# Patient Record
Sex: MALE | Race: WHITE
[De-identification: names, ages, dates, MRNs, and addresses within clinical notes are randomized per-mention and may not be internally consistent; named-entity substitution may affect disease eponyms.]

---

## 2017-09-15 ENCOUNTER — HOSPITAL ENCOUNTER (EMERGENCY)
Dept: HOSPITAL 74 - JERFT | Age: 22
Discharge: HOME | End: 2017-09-15
Payer: COMMERCIAL

## 2017-09-15 VITALS — DIASTOLIC BLOOD PRESSURE: 80 MMHG | SYSTOLIC BLOOD PRESSURE: 138 MMHG | TEMPERATURE: 99.7 F | HEART RATE: 90 BPM

## 2017-09-15 VITALS — BODY MASS INDEX: 26.2 KG/M2

## 2017-09-15 DIAGNOSIS — R50.9: Primary | ICD-10-CM

## 2017-09-15 DIAGNOSIS — G44.89: ICD-10-CM

## 2017-09-15 NOTE — PDOC
History of Present Illness





- General


Chief Complaint: Headache


Stated Complaint: HEADACHE


Time Seen by Provider: 09/15/17 15:04


History Source: Patient


Exam Limitations: No Limitations





- History of Present Illness


Initial Comments: 


09/15/17 15:58





CHIEF COMPLAINT: Headache





HISTORY OF PRESENT ILLNESS: This is an otherwise healthy 22 year old  who 

presents for evaluation of gradual onset, generalized headache, subjective fever

/chills, and one episode of vomiting since last night. He denies neck pain/

stiffness, weakness, visual changes, ataxia, or any other symptoms. He has not 

had any recent travel or sick contacts. He took ibuprofen at 6am with some 

relief of symptoms. He was able to eat normally today.





V/s on arrival are notable for oral temp of 99.7.





REVIEW OF SYSTEMS:


GENERAL/CONSTITUTIONAL: Subjective fevers/cjills. No weakness. No weight change.


HEAD, EYES, EARS, NOSE AND THROAT: No change in vision. Right ear pain, throat 

pain. 


CARDIOVASCULAR: No chest pain or palpitations.


RESPIRATORY: No cough, wheezing, or shortness of breath.


GASTROINTESTINAL: Vomiting x 1. No diarrhea or constipation. 


GENITOURINARY: No dysuria, frequency, or change in urination.


MUSCULOSKELETAL: No joint or muscle swelling or pain. No neck or back pain.


SKIN: No rash or easy bruising.


NEUROLOGIC: Gradual onset, generalized headache. o vertigo, loss of 

consciousness, or loss of sensation.


PSYCHIATRIC: No depression or anxiety.


ENDOCRINE: No increased thirst. No abnormal weight change.


HEMATOLOGIC/LYMPHATIC: No anemia, easy bleeding, or history of blood clots.


ALLERGIC/IMMUNOLOGIC: No hives or skin allergy. No latex allergy.





PHYSICAL EXAM:


GENERAL: The patient is awake, alert, and fully oriented, in no acute distress.


HEAD: Normal with no signs of trauma.


ENT: Pupils equal, round and reactive to light, extraocular movements intact, 

sclera anicteric, conjunctiva clear. Neck supple. No pharyngeal erythema or 

tonsillar swelling. Auditory canals and TMs normal.


LUNGS: Clear to auscultation bilaterally. Normal excursion. No respiratory 

distress or use of accessory muscles.


CV: RRR, S1/S2, no MRG. Cap refill < 2 sec.


ABDOMEN: Soft, non-distended, non-tender.


EXTREMITIES: Normal range of motion, no edema.


NEUROLOGICAL: Normal speech, normal gait. CN II-XII grossly intact.


PSYCH: Normal mood, normal affect.


SKIN: Warm, dry, normal turgor, no rashes or lesions noted.





Past History





- Past Medical History


Allergies/Adverse Reactions: 


 Allergies











Allergy/AdvReac Type Severity Reaction Status Date / Time


 


No Known Allergies Allergy   Verified 09/15/17 14:53











Home Medications: 


Ambulatory Orders





Ibuprofen [Motrin -] 600 mg PO QID PRN #28 tablet 09/15/17 








Other medical history: denies.





- Psycho/Social/Smoking Cessation Hx


Suicidal Ideation: No


Smoking History: Never smoked


Hx Alcohol Use: No


Drug/Substance Use Hx: No


Substance Use Type: None





*Physical Exam





- Vital Signs


 Last Vital Signs











Temp Pulse Resp BP Pulse Ox


 


 99.7 F H  90   18   138/80   97 


 


 09/15/17 14:53  09/15/17 14:53  09/15/17 14:53  09/15/17 14:53  09/15/17 14:53














Medical Decision Making





- Medical Decision Making





09/15/17 17:03


A/P: 22 year old male with subjective fever/chills, headache, and one episode 

of vomiting. No neurologic deficits or meningismus. Tolerating PO. Suspect 

viral syndrome.





-Ibuprofen for symptomatic relief


-Flu negative


-Followup instructions and return precautions reviewed





*DC/Admit/Observation/Transfer


Diagnosis at time of Disposition: 


Fever


Qualifiers:


 Fever type: unspecified Qualified Code(s): R50.9 - Fever, unspecified





Headache


Qualifiers:


 Headache type: other headache syndrome Qualified Code(s): G44.89 - Other 

headache syndrome





- Discharge Dispostion


Disposition: HOME


Condition at time of disposition: Improved


Admit: No





- Prescriptions


Prescriptions: 


Ibuprofen [Motrin -] 600 mg PO QID PRN #28 tablet


 PRN Reason: Pain Or Fever





- Referrals


Referrals: 


Zaida Rodgers MD [Staff Physician] - Call tomorrow (Primary care)





- Patient Instructions


Printed Discharge Instructions:  DI for Headache


Additional Instructions: 


Your flu test is negative.


Rest and stay well-hydrated.


Take ibuprofen as prescribed for pain and fever.


Follow up with primary care (referral enclosed).


Return for sudden/worsening headache or any other concerning symptoms.





- Post Discharge Activity


Work/School Note:  Back to Work

## 2018-06-06 ENCOUNTER — HOSPITAL ENCOUNTER (EMERGENCY)
Dept: HOSPITAL 74 - JERFT | Age: 23
Discharge: HOME | End: 2018-06-06
Payer: SELF-PAY

## 2018-06-06 VITALS — SYSTOLIC BLOOD PRESSURE: 125 MMHG | TEMPERATURE: 98.3 F | DIASTOLIC BLOOD PRESSURE: 77 MMHG

## 2018-06-06 VITALS — BODY MASS INDEX: 27.4 KG/M2
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
## 2018-06-06 DIAGNOSIS — S61.012A: Primary | ICD-10-CM
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
## 2018-06-06 PROCEDURE — 0HQGXZZ REPAIR LEFT HAND SKIN, EXTERNAL APPROACH: ICD-10-PCS

## 2018-06-06 NOTE — PDOC
Rapid Medical Evaluation


Time Seen by Provider: 06/06/18 17:00


Medical Evaluation: 


 Allergies











Allergy/AdvReac Type Severity Reaction Status Date / Time


 


No Known Allergies Allergy   Verified 09/15/17 14:53








I have performed a brief in-person evaluation of this patient. 


The patient presents with a chief complaint of:  laceration to left thumb on 

glass doorknob that broke


Pertinent physical exam findings:  laceration to tip of left thumb


I have ordered the following:  Tetanus


The patient will proceed to the ED for further evaluation.

















**Discharge Disposition





- Diagnosis


Finger laceration


Qualifiers:


 Encounter type: initial encounter Finger: thumb Damage to nail status: 

unspecified Foreign body presence: without foreign body Laterality: left 

Qualified Code(s): S61.012A - Laceration without foreign body of left thumb 

without damage to nail, initial encounter








- Referrals





- Patient Instructions





- Post Discharge Activity

## 2018-06-06 NOTE — PDOC
History of Present Illness





- General


Chief Complaint: Injury


Stated Complaint: LACERATION


Time Seen by Provider: 06/06/18 17:00


History Source: Patient


Exam Limitations: No Limitations





- History of Present Illness


Initial Comments: 





06/06/18 18:02


Patient is a 22-year-old male with no past medical history who presents 

emergency department today with a laceration to his left distal finger. Patient 

states he was turning a glass doorknob when it broke cutting his finger. He 

does not remit the date of his last tetanus shot. States he is able to fully 

move his thumb. Denies fevers, chills, weakness, numbness and tingling to the 

extremity.





Past History





- Travel


Traveled outside of the country in the last 30 days: No


Close contact w/someone who was outside of country & ill: No





- Past History


Allergies/Adverse Reactions: 


Allergies





No Known Allergies Allergy (Verified 06/06/18 17:01)


 








Home Medications: 


Ambulatory Orders





NK [No Known Home Medication]  06/06/18 








Tetanus Status: Unknown





- Social History


Smoking Status: Never smoked





**Review of Systems





- Review of Systems


Able to Perform ROS?: Yes


Comments:: 





06/06/18 17:20


CONSTITUTIONAL: 


Absent: fever, chills, diaphoresis, generalized weakness, malaise, loss of 

appetite


MUSCULOSKELETAL: 


Absent: myalgia, arthralgia, joint swelling


SKIN: 


Laceration to L thumb Absent: rash, itching, pallor


NEUROLOGIC: 


Absent: headache, focal weakness or paresthesias, dizziness, unsteady gait, 

seizure, mental status changes, bladder or bowel incontinence


PSYCHIATRIC: 


Absent: anxiety, depression, suicidal or homicidal ideation, hallucinations.


Is the patient limited English proficient: No





*Physical Exam





- Vital Signs


 Last Vital Signs











Temp Pulse Resp BP Pulse Ox


 


 98.3 F         125/77   100 


 


 06/06/18 17:01        06/06/18 17:01  06/06/18 17:01














- Physical Exam


Comments: 





06/06/18 17:21


GENERAL: [The patient is awake, alert, and fully oriented, in no acute distress.

]


HEAD: [Normal with no signs of trauma.]


EYES: [Pupils equal, round and reactive to light, extraocular movements intact, 

sclera anicteric, conjunctiva clear.]


EXTREMITIES: [Normal range of motion, able to fully flex and extend L thumb, no 

edema.]


NEUROLOGICAL: [Normal speech, normal gait.]


PSYCH: [Normal mood, normal affect.]


SKIN: [2.5cm flap laceration to the distal L thumb. Warm, Dry, normal turgor, 

no rashes or lesions noted.]








Procedures





- Laceration/Wound Repair


  ** Left Distal 1st digit


Wound Length: to 2.5 cm


Wound's Depth, Shape: superficial, flap


Irrigated w/ Saline: Yes


Betadine Prep: Yes


Anesthesia: 1% Lidocaine


Amount of Anesthetic (ccs): 2 (Digital block)


Wound Repaired With: Sutures


Suture Size/Type: 5:0


Number of Sutures: 6 (simple interrupted)


Layer Closure: No


Sterile Dressing Applied: Yes





Medical Decision Making





- Medical Decision Making





06/06/18 18:33


Patient is a 22-year-old male with no past medical history who presents 

emergency department today with a laceration to his left distal thumb. Wound 

repaired with sutures at this time. See procedure note. Patient has right-hand 

dominant. Return precautions given. Instructed patient to return in 7-10 days 

to have his sutures removed. Patient says all discharge instructions and all 

questions were answered.





*DC/Admit/Observation/Transfer


Diagnosis at time of Disposition: 


Finger laceration


Qualifiers:


 Encounter type: initial encounter Finger: thumb Damage to nail status: 

unspecified Foreign body presence: without foreign body Laterality: left 

Qualified Code(s): S61.012A - Laceration without foreign body of left thumb 

without damage to nail, initial encounter








- Discharge Dispostion


Disposition: HOME


Condition at time of disposition: Good


Decision to Admit order: No





- Referrals


Referrals: 


aNgi Francisco MD [Staff Physician] - 





- Patient Instructions


Printed Discharge Instructions:  DI for Laceration Repair


Additional Instructions: 


You had your cut fixed today with stitches.


Please return in 7-10 days to have your stitches removed.


Your tetanus shot was updated today.


Avoid soaking the finger. Keep it dry when showering. Wear a glove when 

showering or washing dishes.


Please keep the area clean and pat dry. 


You may use bacitracin once a day.


You may take Tylenol or Motrin as needed for pain. Follow the 's 

instructions





Return to the emergency department sooner if you have area of redness around 

the site, purulent drainage, fevers, or have any changes in your symptoms.





- Post Discharge Activity


Forms/Work/School Notes:  Back to Work

## 2018-06-15 ENCOUNTER — HOSPITAL ENCOUNTER (EMERGENCY)
Dept: HOSPITAL 74 - JERFT | Age: 23
Discharge: HOME | End: 2018-06-15
Payer: SELF-PAY

## 2018-06-15 VITALS — HEART RATE: 80 BPM | TEMPERATURE: 98.2 F | SYSTOLIC BLOOD PRESSURE: 137 MMHG | DIASTOLIC BLOOD PRESSURE: 60 MMHG

## 2018-06-15 VITALS — BODY MASS INDEX: 26.6 KG/M2

## 2018-06-15 DIAGNOSIS — Z48.02: Primary | ICD-10-CM

## 2018-06-15 NOTE — PDOC
Suture Removal/Wound Check HPI





- History of Present Illness


Chief Complaint: Suture/Staple Removal(Here)


Stated Complaint: SUTURE REMOVAL


Time Seen by Provider: 06/15/18 16:42


History Source: Yes: Patient


Exam Limitations: Yes: No Limitations


Treated at: [facility]


Date of Last ED visit: 06/06/18





- Previous ED Treatment


Type of procedure performed on last visit: Yes: Laceration Repair


Tetanus Immunization: Yes: Up to Date





Past History





- Past Medical History


Allergies/Adverse Reactions: 


 Allergies











Allergy/AdvReac Type Severity Reaction Status Date / Time


 


No Known Allergies Allergy   Verified 06/06/18 17:01











Home Medications: 


Ambulatory Orders





NK [No Known Home Medication]  06/06/18 








COPD: No





- Suicide/Smoking/Psychosocial Hx


Smoking History: Never smoked


Have you smoked in the past 12 months: No


Hx Alcohol Use: No


Drug/Substance Use Hx: No


Substance Use Type: None





Suture Removal/Wound Check PE





- Physical Exam


Laceration/Wound Check Symptoms: reports: None


Current Severity Level: None


Maximum Severity Level: None


Pain Localization: None


Location of Laceration/Wound: left: Finger (thumb)


Pain Radiation: None





Medical Decision Making





- Medical Decision Making





06/15/18 16:50


A/P:





Wound well approximated. No discharge or drainage. 6 sutures removed without 

incident.





*DC/Admit/Observation/Transfer


Diagnosis at time of Disposition: 


 Visit for suture removal








- Discharge Dispostion


Disposition: HOME


Condition at time of disposition: Stable


Decision to Admit order: No





- Referrals





- Patient Instructions


Printed Discharge Instructions:  DI for Suture Removal


Additional Instructions: 


Return to ER for any concerns.





- Post Discharge Activity

## 2022-11-29 ENCOUNTER — HOSPITAL ENCOUNTER (EMERGENCY)
Dept: HOSPITAL 74 - JER | Age: 27
Discharge: HOME | End: 2022-11-29
Payer: COMMERCIAL

## 2022-11-29 VITALS
SYSTOLIC BLOOD PRESSURE: 113 MMHG | TEMPERATURE: 97.8 F | RESPIRATION RATE: 18 BRPM | DIASTOLIC BLOOD PRESSURE: 69 MMHG | HEART RATE: 70 BPM

## 2022-11-29 VITALS — BODY MASS INDEX: 26.6 KG/M2

## 2022-11-29 DIAGNOSIS — R07.89: Primary | ICD-10-CM

## 2022-11-29 LAB
ALBUMIN SERPL-MCNC: 4 G/DL (ref 3.4–5)
ALP SERPL-CCNC: 103 U/L (ref 45–117)
ALT SERPL-CCNC: 106 U/L (ref 13–61)
ANION GAP SERPL CALC-SCNC: 7 MMOL/L (ref 8–16)
AST SERPL-CCNC: 40 U/L (ref 15–37)
BASOPHILS # BLD: 0.7 % (ref 0–2)
BILIRUB SERPL-MCNC: 0.4 MG/DL (ref 0.2–1)
BUN SERPL-MCNC: 11.8 MG/DL (ref 7–18)
CALCIUM SERPL-MCNC: 8.8 MG/DL (ref 8.5–10.1)
CHLORIDE SERPL-SCNC: 104 MMOL/L (ref 98–107)
CO2 SERPL-SCNC: 28 MMOL/L (ref 21–32)
CREAT SERPL-MCNC: 1 MG/DL (ref 0.55–1.3)
DEPRECATED RDW RBC AUTO: 13 % (ref 11.9–15.9)
EOSINOPHIL # BLD: 2.8 % (ref 0–4.5)
GLUCOSE SERPL-MCNC: 93 MG/DL (ref 74–106)
HCT VFR BLD CALC: 46.3 % (ref 35.4–49)
HGB BLD-MCNC: 15.7 GM/DL (ref 11.7–16.9)
LYMPHOCYTES # BLD: 34.9 % (ref 8–40)
MAGNESIUM SERPL-MCNC: 2.2 MG/DL (ref 1.8–2.4)
MCH RBC QN AUTO: 31 PG (ref 25.7–33.7)
MCHC RBC AUTO-ENTMCNC: 33.8 G/DL (ref 32–35.9)
MCV RBC: 91.7 FL (ref 80–96)
MONOCYTES # BLD AUTO: 9.7 % (ref 3.8–10.2)
NEUTROPHILS # BLD: 51.9 % (ref 42.8–82.8)
PLATELET # BLD AUTO: 297 10^3/UL (ref 134–434)
PMV BLD: 7.9 FL (ref 7.5–11.1)
PROT SERPL-MCNC: 7.5 G/DL (ref 6.4–8.2)
RBC # BLD AUTO: 5.05 M/MM3 (ref 4–5.6)
SODIUM SERPL-SCNC: 139 MMOL/L (ref 136–145)
WBC # BLD AUTO: 7.8 K/MM3 (ref 4–10)

## 2022-11-29 PROCEDURE — 3E023GC INTRODUCTION OF OTHER THERAPEUTIC SUBSTANCE INTO MUSCLE, PERCUTANEOUS APPROACH: ICD-10-PCS

## 2023-04-25 ENCOUNTER — HOSPITAL ENCOUNTER (EMERGENCY)
Dept: HOSPITAL 74 - JERFT | Age: 28
Discharge: HOME | End: 2023-04-25
Payer: COMMERCIAL

## 2023-04-25 VITALS — BODY MASS INDEX: 26.6 KG/M2

## 2023-04-25 VITALS
HEART RATE: 77 BPM | TEMPERATURE: 98.2 F | DIASTOLIC BLOOD PRESSURE: 82 MMHG | RESPIRATION RATE: 18 BRPM | SYSTOLIC BLOOD PRESSURE: 118 MMHG

## 2023-04-25 DIAGNOSIS — S91.331A: Primary | ICD-10-CM
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
## 2023-05-06 ENCOUNTER — HOSPITAL ENCOUNTER (EMERGENCY)
Dept: HOSPITAL 74 - JERFT | Age: 28
Discharge: HOME | End: 2023-05-06
Payer: COMMERCIAL

## 2023-05-06 VITALS
HEART RATE: 73 BPM | DIASTOLIC BLOOD PRESSURE: 75 MMHG | SYSTOLIC BLOOD PRESSURE: 119 MMHG | RESPIRATION RATE: 19 BRPM | TEMPERATURE: 97.8 F

## 2023-05-06 VITALS — BODY MASS INDEX: 27.1 KG/M2

## 2023-05-06 DIAGNOSIS — H02.844: Primary | ICD-10-CM

## 2023-09-01 ENCOUNTER — OFFICE (OUTPATIENT)
Dept: URBAN - METROPOLITAN AREA CLINIC 121 | Facility: CLINIC | Age: 28
Setting detail: OPHTHALMOLOGY
End: 2023-09-01
Payer: MEDICAID

## 2023-09-01 DIAGNOSIS — H47.393: ICD-10-CM

## 2023-09-01 PROCEDURE — 92004 COMPRE OPH EXAM NEW PT 1/>: CPT | Performed by: OPHTHALMOLOGY

## 2023-09-01 PROCEDURE — 92250 FUNDUS PHOTOGRAPHY W/I&R: CPT | Performed by: OPHTHALMOLOGY

## 2023-09-01 ASSESSMENT — PACHYMETRY
OD_CT_UM: 508
OS_CT_UM: 513
OS_CT_CORRECTION: 2
OD_CT_CORRECTION: 3

## 2023-09-01 ASSESSMENT — TONOMETRY
OD_IOP_MMHG: 16
OS_IOP_MMHG: 16

## 2023-09-01 ASSESSMENT — KERATOMETRY
OS_K1POWER_DIOPTERS: 41.25
OS_AXISANGLE_DEGREES: 087
OD_K1POWER_DIOPTERS: 41.00
OD_K2POWER_DIOPTERS: 42.50
OD_AXISANGLE_DEGREES: 090
OS_K2POWER_DIOPTERS: 42.00

## 2023-09-01 ASSESSMENT — AXIALLENGTH_DERIVED
OS_AL: 24.6154
OD_AL: 24.5655

## 2023-09-01 ASSESSMENT — SPHEQUIV_DERIVED
OS_SPHEQUIV: -0.75
OD_SPHEQUIV: -0.75

## 2023-09-01 ASSESSMENT — VISUAL ACUITY
OD_BCVA: 20/25
OS_BCVA: 20/25

## 2023-09-01 ASSESSMENT — REFRACTION_AUTOREFRACTION
OD_SPHERE: -1.00
OD_CYLINDER: +0.50
OS_AXIS: 074
OS_CYLINDER: +0.50
OD_AXIS: 082
OS_SPHERE: -1.00

## 2023-09-01 ASSESSMENT — CONFRONTATIONAL VISUAL FIELD TEST (CVF)
OD_FINDINGS: FULL
OS_FINDINGS: FULL

## 2025-02-18 ENCOUNTER — HOSPITAL ENCOUNTER (EMERGENCY)
Dept: HOSPITAL 74 - JER | Age: 30
Discharge: HOME | End: 2025-02-18
Payer: COMMERCIAL

## 2025-02-18 VITALS — BODY MASS INDEX: 26.6 KG/M2

## 2025-02-18 VITALS
TEMPERATURE: 98.4 F | SYSTOLIC BLOOD PRESSURE: 116 MMHG | DIASTOLIC BLOOD PRESSURE: 84 MMHG | RESPIRATION RATE: 18 BRPM | HEART RATE: 76 BPM

## 2025-02-18 DIAGNOSIS — R11.0: ICD-10-CM

## 2025-02-18 DIAGNOSIS — K21.9: Primary | ICD-10-CM

## 2025-02-18 DIAGNOSIS — R10.10: ICD-10-CM

## 2025-02-18 DIAGNOSIS — Z20.822: ICD-10-CM

## 2025-02-18 LAB
ALBUMIN SERPL-MCNC: 3.9 G/DL (ref 3.4–5)
ALP SERPL-CCNC: 91 U/L (ref 45–117)
ALT SERPL-CCNC: 37 U/L (ref 13–61)
ANION GAP SERPL CALC-SCNC: 3 MMOL/L (ref 4–13)
AST SERPL-CCNC: 21 U/L (ref 15–37)
BASOPHILS # BLD: 0.6 % (ref 0–2)
BILIRUB SERPL-MCNC: 0.4 MG/DL (ref 0.2–1)
BUN SERPL-MCNC: 11.3 MG/DL (ref 7–18)
CALCIUM SERPL-MCNC: 9.4 MG/DL (ref 8.5–10.1)
CHLORIDE SERPL-SCNC: 106 MMOL/L (ref 98–107)
CO2 SERPL-SCNC: 30 MMOL/L (ref 21–32)
CREAT SERPL-MCNC: 0.9 MG/DL (ref 0.55–1.3)
DEPRECATED RDW RBC AUTO: 13.4 % (ref 11.9–15.9)
EOSINOPHIL # BLD: 2.4 % (ref 0–4.5)
GLUCOSE SERPL-MCNC: 90 MG/DL (ref 74–106)
HCT VFR BLD CALC: 42.3 % (ref 35.4–49)
HGB BLD-MCNC: 14.3 GM/DL (ref 11.7–16.9)
HIV 1+2 AB+HIV1 P24 AG SERPL QL IA: NEGATIVE
LYMPHOCYTES # BLD: 35.9 % (ref 8–40)
MCH RBC QN AUTO: 31.4 PG (ref 25.7–33.7)
MCHC RBC AUTO-ENTMCNC: 33.9 G/DL (ref 32–35.9)
MCV RBC: 92.5 FL (ref 80–96)
MONOCYTES # BLD AUTO: 8.5 % (ref 3.8–10.2)
NEUTROPHILS # BLD: 52.6 % (ref 42.8–82.8)
PLATELET # BLD AUTO: 322 10^3/UL (ref 134–434)
PMV BLD: 7.6 FL (ref 7.5–11.1)
POTASSIUM SERPLBLD-SCNC: 4.6 MMOL/L (ref 3.5–5.1)
PROT SERPL-MCNC: 7.3 G/DL (ref 6.4–8.2)
RBC # BLD AUTO: 4.57 M/MM3 (ref 4–5.6)
SODIUM SERPL-SCNC: 139 MMOL/L (ref 136–145)
WBC # BLD AUTO: 6.4 K/MM3 (ref 4–10)

## 2025-02-18 RX ADMIN — ONDANSETRON ONE MG: 4 TABLET, ORALLY DISINTEGRATING ORAL at 15:30

## 2025-02-18 RX ADMIN — ALUMINUM HYDROXIDE, MAGNESIUM HYDROXIDE, AND SIMETHICONE ONE ML: 200; 200; 20 SUSPENSION ORAL at 15:30
